# Patient Record
Sex: MALE | Race: WHITE | Employment: OTHER | URBAN - METROPOLITAN AREA
[De-identification: names, ages, dates, MRNs, and addresses within clinical notes are randomized per-mention and may not be internally consistent; named-entity substitution may affect disease eponyms.]

---

## 2017-01-23 PROCEDURE — 87086 URINE CULTURE/COLONY COUNT: CPT | Performed by: FAMILY MEDICINE

## 2017-01-26 PROBLEM — E11.9 TYPE 2 DIABETES MELLITUS WITHOUT COMPLICATION, WITHOUT LONG-TERM CURRENT USE OF INSULIN (HCC): Status: ACTIVE | Noted: 2017-01-26

## 2017-05-04 PROCEDURE — 81003 URINALYSIS AUTO W/O SCOPE: CPT | Performed by: FAMILY MEDICINE

## 2017-05-25 ENCOUNTER — HOSPITAL ENCOUNTER (OUTPATIENT)
Dept: CT IMAGING | Facility: HOSPITAL | Age: 62
Discharge: HOME | End: 2017-05-25
Attending: INTERNAL MEDICINE
Payer: COMMERCIAL

## 2017-05-25 DIAGNOSIS — R94.39 ABNORMAL CARDIOVASCULAR STRESS TEST: ICD-10-CM

## 2017-05-25 DIAGNOSIS — E78.00 HYPERCHOLESTEROLEMIA: ICD-10-CM

## 2017-05-25 DIAGNOSIS — R07.89 ATYPICAL CHEST PAIN: ICD-10-CM

## 2017-05-25 PROCEDURE — 75574 CT ANGIO HRT W/3D IMAGE: CPT | Performed by: INTERNAL MEDICINE

## 2017-05-25 RX ORDER — METOPROLOL TARTRATE 50 MG/1
TABLET, FILM COATED ORAL
Status: COMPLETED
Start: 2017-05-25 | End: 2017-05-25

## 2017-05-25 RX ORDER — NITROGLYCERIN 0.4 MG/1
TABLET SUBLINGUAL
Status: COMPLETED
Start: 2017-05-25 | End: 2017-05-25

## 2017-05-25 RX ORDER — METOPROLOL TARTRATE 5 MG/5ML
INJECTION INTRAVENOUS
Status: COMPLETED
Start: 2017-05-25 | End: 2017-05-25

## 2017-05-25 RX ADMIN — METOPROLOL TARTRATE 5 MG: 5 INJECTION INTRAVENOUS at 10:48:00

## 2017-05-25 RX ADMIN — NITROGLYCERIN 0.4 MG: 0.4 TABLET SUBLINGUAL at 10:35:00

## 2017-05-25 RX ADMIN — METOPROLOL TARTRATE: 5 INJECTION INTRAVENOUS at 10:15:00

## 2017-05-25 RX ADMIN — METOPROLOL TARTRATE 50 MG: 50 TABLET, FILM COATED ORAL at 09:30:00

## 2017-10-06 PROCEDURE — 87086 URINE CULTURE/COLONY COUNT: CPT | Performed by: FAMILY MEDICINE

## 2018-03-01 PROBLEM — Z87.438 HISTORY OF PROSTATITIS: Status: ACTIVE | Noted: 2018-03-01

## 2018-03-01 PROBLEM — R10.9 RIGHT FLANK PAIN: Status: ACTIVE | Noted: 2018-03-01

## 2018-03-01 PROBLEM — Z12.5 PROSTATE CANCER SCREENING: Status: ACTIVE | Noted: 2018-03-01

## 2018-03-01 PROBLEM — N28.1 RENAL CYST, RIGHT: Status: ACTIVE | Noted: 2018-03-01

## 2018-05-11 PROCEDURE — 82043 UR ALBUMIN QUANTITATIVE: CPT | Performed by: FAMILY MEDICINE

## 2018-05-11 PROCEDURE — 82570 ASSAY OF URINE CREATININE: CPT | Performed by: FAMILY MEDICINE

## 2018-05-11 PROCEDURE — 81003 URINALYSIS AUTO W/O SCOPE: CPT | Performed by: FAMILY MEDICINE

## 2018-06-28 PROBLEM — E78.5 HYPERLIPIDEMIA ASSOCIATED WITH TYPE 2 DIABETES MELLITUS (HCC): Status: ACTIVE | Noted: 2018-06-28

## 2018-06-28 PROBLEM — E11.69 HYPERLIPIDEMIA ASSOCIATED WITH TYPE 2 DIABETES MELLITUS (HCC): Status: ACTIVE | Noted: 2018-06-28

## 2018-07-09 PROBLEM — E11.29 TYPE 2 DIABETES MELLITUS WITH MICROALBUMINURIA, WITHOUT LONG-TERM CURRENT USE OF INSULIN (HCC): Status: ACTIVE | Noted: 2017-01-26

## 2018-07-09 PROBLEM — I70.0 ATHEROSCLEROSIS OF AORTA (HCC): Status: ACTIVE | Noted: 2018-07-09

## 2018-07-09 PROBLEM — R80.9 TYPE 2 DIABETES MELLITUS WITH MICROALBUMINURIA, WITHOUT LONG-TERM CURRENT USE OF INSULIN (HCC): Status: ACTIVE | Noted: 2017-01-26

## 2018-08-14 PROBLEM — I25.10 CORONARY ARTERY DISEASE INVOLVING NATIVE CORONARY ARTERY OF NATIVE HEART WITHOUT ANGINA PECTORIS: Status: ACTIVE | Noted: 2018-08-14

## 2018-10-09 ENCOUNTER — APPOINTMENT (OUTPATIENT)
Dept: LAB | Age: 63
End: 2018-10-09
Attending: INTERNAL MEDICINE
Payer: MEDICARE

## 2018-10-09 DIAGNOSIS — E78.5 HYPERLIPIDEMIA ASSOCIATED WITH TYPE 2 DIABETES MELLITUS (HCC): ICD-10-CM

## 2018-10-09 DIAGNOSIS — I49.3 FREQUENT PVCS: ICD-10-CM

## 2018-10-09 DIAGNOSIS — I25.10 CORONARY ARTERY DISEASE INVOLVING NATIVE CORONARY ARTERY OF NATIVE HEART WITHOUT ANGINA PECTORIS: ICD-10-CM

## 2018-10-09 DIAGNOSIS — E11.69 HYPERLIPIDEMIA ASSOCIATED WITH TYPE 2 DIABETES MELLITUS (HCC): ICD-10-CM

## 2018-10-09 PROCEDURE — 84132 ASSAY OF SERUM POTASSIUM: CPT

## 2018-10-09 PROCEDURE — 36415 COLL VENOUS BLD VENIPUNCTURE: CPT

## 2018-12-01 PROCEDURE — 87086 URINE CULTURE/COLONY COUNT: CPT | Performed by: PHYSICIAN ASSISTANT

## 2019-03-07 PROCEDURE — 87205 SMEAR GRAM STAIN: CPT | Performed by: UROLOGY

## 2019-03-07 PROCEDURE — 87077 CULTURE AEROBIC IDENTIFY: CPT | Performed by: UROLOGY

## 2019-03-07 PROCEDURE — 87070 CULTURE OTHR SPECIMN AEROBIC: CPT | Performed by: UROLOGY

## 2019-05-14 PROBLEM — Z00.00 MEDICARE ANNUAL WELLNESS VISIT, SUBSEQUENT: Status: ACTIVE | Noted: 2019-05-14

## 2019-06-04 PROBLEM — K57.10 DUODENAL DIVERTICULUM: Status: ACTIVE | Noted: 2019-06-04

## 2020-05-19 PROBLEM — M17.11 PRIMARY OSTEOARTHRITIS OF RIGHT KNEE: Status: ACTIVE | Noted: 2020-05-19

## 2020-06-30 PROBLEM — I70.8 AORTO-ILIAC ATHEROSCLEROSIS (HCC): Status: ACTIVE | Noted: 2018-07-09

## 2020-06-30 PROBLEM — I70.0 AORTO-ILIAC ATHEROSCLEROSIS (HCC): Status: ACTIVE | Noted: 2018-07-09

## 2020-08-18 ENCOUNTER — LAB ENCOUNTER (OUTPATIENT)
Dept: LAB | Facility: HOSPITAL | Age: 65
End: 2020-08-18
Attending: ORTHOPAEDIC SURGERY
Payer: MEDICARE

## 2020-08-18 DIAGNOSIS — M17.12 PRIMARY OSTEOARTHRITIS OF LEFT KNEE: ICD-10-CM

## 2020-08-19 LAB — SARS-COV-2 RNA RESP QL NAA+PROBE: NOT DETECTED

## 2020-08-20 ENCOUNTER — APPOINTMENT (OUTPATIENT)
Dept: GENERAL RADIOLOGY | Facility: HOSPITAL | Age: 65
End: 2020-08-20
Attending: PHYSICIAN ASSISTANT
Payer: MEDICARE

## 2020-08-20 ENCOUNTER — ANESTHESIA EVENT (OUTPATIENT)
Dept: SURGERY | Facility: HOSPITAL | Age: 65
End: 2020-08-20
Payer: MEDICARE

## 2020-08-20 ENCOUNTER — HOSPITAL ENCOUNTER (OUTPATIENT)
Facility: HOSPITAL | Age: 65
Discharge: HOME HEALTH | End: 2020-08-21
Attending: ORTHOPAEDIC SURGERY | Admitting: ORTHOPAEDIC SURGERY
Payer: MEDICARE

## 2020-08-20 ENCOUNTER — ANESTHESIA (OUTPATIENT)
Dept: SURGERY | Facility: HOSPITAL | Age: 65
End: 2020-08-20
Payer: MEDICARE

## 2020-08-20 DIAGNOSIS — M17.12 PRIMARY OSTEOARTHRITIS OF LEFT KNEE: Primary | ICD-10-CM

## 2020-08-20 LAB
ANTIBODY SCREEN: NEGATIVE
GLUCOSE BLD-MCNC: 114 MG/DL (ref 70–99)
GLUCOSE BLD-MCNC: 145 MG/DL (ref 70–99)
GLUCOSE BLD-MCNC: 163 MG/DL (ref 70–99)
GLUCOSE BLD-MCNC: 171 MG/DL (ref 70–99)
RH BLOOD TYPE: POSITIVE

## 2020-08-20 PROCEDURE — 82962 GLUCOSE BLOOD TEST: CPT

## 2020-08-20 PROCEDURE — 97116 GAIT TRAINING THERAPY: CPT

## 2020-08-20 PROCEDURE — 76942 ECHO GUIDE FOR BIOPSY: CPT | Performed by: ANESTHESIOLOGY

## 2020-08-20 PROCEDURE — 86900 BLOOD TYPING SEROLOGIC ABO: CPT

## 2020-08-20 PROCEDURE — 97161 PT EVAL LOW COMPLEX 20 MIN: CPT

## 2020-08-20 PROCEDURE — 97110 THERAPEUTIC EXERCISES: CPT

## 2020-08-20 PROCEDURE — 0SRD0J9 REPLACEMENT OF LEFT KNEE JOINT WITH SYNTHETIC SUBSTITUTE, CEMENTED, OPEN APPROACH: ICD-10-PCS | Performed by: ORTHOPAEDIC SURGERY

## 2020-08-20 PROCEDURE — 73560 X-RAY EXAM OF KNEE 1 OR 2: CPT | Performed by: PHYSICIAN ASSISTANT

## 2020-08-20 PROCEDURE — 86850 RBC ANTIBODY SCREEN: CPT

## 2020-08-20 PROCEDURE — 88311 DECALCIFY TISSUE: CPT | Performed by: ORTHOPAEDIC SURGERY

## 2020-08-20 PROCEDURE — 86901 BLOOD TYPING SEROLOGIC RH(D): CPT

## 2020-08-20 PROCEDURE — 88305 TISSUE EXAM BY PATHOLOGIST: CPT | Performed by: ORTHOPAEDIC SURGERY

## 2020-08-20 DEVICE — SMARTSET GHV GENTAMICIN HIGH VISCOSITY BONE CEMENT 40G
Type: IMPLANTABLE DEVICE | Site: HIP | Status: FUNCTIONAL
Brand: SMARTSET

## 2020-08-20 DEVICE — ATTUNE KNEE SYSTEM FEMORAL POSTERIOR STABILIZED SIZE 6 LEFT CEMENTED
Type: IMPLANTABLE DEVICE | Site: HIP | Status: FUNCTIONAL
Brand: ATTUNE

## 2020-08-20 DEVICE — ATTUNE KNEE SYSTEM TIBIAL INSERT ROTATING PLATFORM POSTERIOR STABILIZED 6 7MM AOX
Type: IMPLANTABLE DEVICE | Site: HIP | Status: FUNCTIONAL
Brand: ATTUNE

## 2020-08-20 DEVICE — ATTUNE PATELLA MEDIALIZED ANATOMIC 35MM CEMENTED AOX
Type: IMPLANTABLE DEVICE | Site: HIP | Status: FUNCTIONAL
Brand: ATTUNE

## 2020-08-20 DEVICE — ATTUNE KNEE SYSTEM TIBIAL BASE ROTATING PLATFORM SIZE 7 CEMENTED
Type: IMPLANTABLE DEVICE | Site: HIP | Status: FUNCTIONAL
Brand: ATTUNE

## 2020-08-20 RX ORDER — ENALAPRIL MALEATE 2.5 MG/1
2.5 TABLET ORAL DAILY
Status: DISCONTINUED | OUTPATIENT
Start: 2020-08-20 | End: 2020-08-21

## 2020-08-20 RX ORDER — SODIUM PHOSPHATE, DIBASIC AND SODIUM PHOSPHATE, MONOBASIC 7; 19 G/133ML; G/133ML
1 ENEMA RECTAL ONCE AS NEEDED
Status: DISCONTINUED | OUTPATIENT
Start: 2020-08-20 | End: 2020-08-21

## 2020-08-20 RX ORDER — LEVOFLOXACIN 500 MG/1
500 TABLET, FILM COATED ORAL DAILY
Status: DISCONTINUED | OUTPATIENT
Start: 2020-08-20 | End: 2020-08-21

## 2020-08-20 RX ORDER — MELATONIN
325
Status: DISCONTINUED | OUTPATIENT
Start: 2020-08-21 | End: 2020-08-21

## 2020-08-20 RX ORDER — NALOXONE HYDROCHLORIDE 0.4 MG/ML
80 INJECTION, SOLUTION INTRAMUSCULAR; INTRAVENOUS; SUBCUTANEOUS AS NEEDED
Status: DISCONTINUED | OUTPATIENT
Start: 2020-08-20 | End: 2020-08-20 | Stop reason: HOSPADM

## 2020-08-20 RX ORDER — ONDANSETRON 2 MG/ML
4 INJECTION INTRAMUSCULAR; INTRAVENOUS AS NEEDED
Status: DISCONTINUED | OUTPATIENT
Start: 2020-08-20 | End: 2020-08-20 | Stop reason: HOSPADM

## 2020-08-20 RX ORDER — SODIUM CHLORIDE 9 MG/ML
INJECTION, SOLUTION INTRAVENOUS CONTINUOUS
Status: DISCONTINUED | OUTPATIENT
Start: 2020-08-20 | End: 2020-08-21

## 2020-08-20 RX ORDER — DEXTROSE MONOHYDRATE 25 G/50ML
50 INJECTION, SOLUTION INTRAVENOUS
Status: DISCONTINUED | OUTPATIENT
Start: 2020-08-20 | End: 2020-08-21

## 2020-08-20 RX ORDER — DEXAMETHASONE SODIUM PHOSPHATE 4 MG/ML
VIAL (ML) INJECTION AS NEEDED
Status: DISCONTINUED | OUTPATIENT
Start: 2020-08-20 | End: 2020-08-20 | Stop reason: SURG

## 2020-08-20 RX ORDER — POLYETHYLENE GLYCOL 3350 17 G/17G
17 POWDER, FOR SOLUTION ORAL DAILY PRN
Status: DISCONTINUED | OUTPATIENT
Start: 2020-08-20 | End: 2020-08-21

## 2020-08-20 RX ORDER — DEXTROSE MONOHYDRATE 25 G/50ML
50 INJECTION, SOLUTION INTRAVENOUS
Status: DISCONTINUED | OUTPATIENT
Start: 2020-08-20 | End: 2020-08-20 | Stop reason: HOSPADM

## 2020-08-20 RX ORDER — ONDANSETRON 2 MG/ML
4 INJECTION INTRAMUSCULAR; INTRAVENOUS EVERY 4 HOURS PRN
Status: DISCONTINUED | OUTPATIENT
Start: 2020-08-20 | End: 2020-08-21

## 2020-08-20 RX ORDER — OXYCODONE HYDROCHLORIDE 15 MG/1
15 TABLET ORAL EVERY 4 HOURS PRN
Status: DISCONTINUED | OUTPATIENT
Start: 2020-08-20 | End: 2020-08-21

## 2020-08-20 RX ORDER — PANTOPRAZOLE SODIUM 40 MG/1
40 TABLET, DELAYED RELEASE ORAL
COMMUNITY
End: 2021-08-09

## 2020-08-20 RX ORDER — BISACODYL 10 MG
10 SUPPOSITORY, RECTAL RECTAL
Status: DISCONTINUED | OUTPATIENT
Start: 2020-08-20 | End: 2020-08-21

## 2020-08-20 RX ORDER — DIPHENHYDRAMINE HYDROCHLORIDE 50 MG/ML
25 INJECTION INTRAMUSCULAR; INTRAVENOUS ONCE AS NEEDED
Status: ACTIVE | OUTPATIENT
Start: 2020-08-20 | End: 2020-08-20

## 2020-08-20 RX ORDER — ACETAMINOPHEN 325 MG/1
650 TABLET ORAL EVERY 6 HOURS PRN
Status: DISCONTINUED | OUTPATIENT
Start: 2020-08-20 | End: 2020-08-20 | Stop reason: HOSPADM

## 2020-08-20 RX ORDER — SODIUM CHLORIDE, SODIUM LACTATE, POTASSIUM CHLORIDE, CALCIUM CHLORIDE 600; 310; 30; 20 MG/100ML; MG/100ML; MG/100ML; MG/100ML
INJECTION, SOLUTION INTRAVENOUS CONTINUOUS
Status: DISCONTINUED | OUTPATIENT
Start: 2020-08-20 | End: 2020-08-20 | Stop reason: HOSPADM

## 2020-08-20 RX ORDER — PANTOPRAZOLE SODIUM 40 MG/1
40 TABLET, DELAYED RELEASE ORAL
Status: DISCONTINUED | OUTPATIENT
Start: 2020-08-21 | End: 2020-08-21

## 2020-08-20 RX ORDER — DEXAMETHASONE SODIUM PHOSPHATE 10 MG/ML
8 INJECTION, SOLUTION INTRAMUSCULAR; INTRAVENOUS ONCE
Status: COMPLETED | OUTPATIENT
Start: 2020-08-21 | End: 2020-08-21

## 2020-08-20 RX ORDER — HYDROMORPHONE HYDROCHLORIDE 1 MG/ML
0.4 INJECTION, SOLUTION INTRAMUSCULAR; INTRAVENOUS; SUBCUTANEOUS EVERY 5 MIN PRN
Status: DISCONTINUED | OUTPATIENT
Start: 2020-08-20 | End: 2020-08-20 | Stop reason: HOSPADM

## 2020-08-20 RX ORDER — BUPRENORPHINE HYDROCHLORIDE 0.32 MG/ML
INJECTION INTRAMUSCULAR; INTRAVENOUS AS NEEDED
Status: DISCONTINUED | OUTPATIENT
Start: 2020-08-20 | End: 2020-08-20 | Stop reason: SURG

## 2020-08-20 RX ORDER — METOCLOPRAMIDE HYDROCHLORIDE 5 MG/ML
10 INJECTION INTRAMUSCULAR; INTRAVENOUS AS NEEDED
Status: DISCONTINUED | OUTPATIENT
Start: 2020-08-20 | End: 2020-08-20 | Stop reason: HOSPADM

## 2020-08-20 RX ORDER — ENALAPRIL MALEATE 2.5 MG/1
2.5 TABLET ORAL DAILY
COMMUNITY
End: 2021-08-09

## 2020-08-20 RX ORDER — METOCLOPRAMIDE HYDROCHLORIDE 5 MG/ML
10 INJECTION INTRAMUSCULAR; INTRAVENOUS EVERY 6 HOURS PRN
Status: DISCONTINUED | OUTPATIENT
Start: 2020-08-20 | End: 2020-08-21

## 2020-08-20 RX ORDER — ACETAMINOPHEN 500 MG
1000 TABLET ORAL 4 TIMES DAILY
Status: DISCONTINUED | OUTPATIENT
Start: 2020-08-20 | End: 2020-08-21

## 2020-08-20 RX ORDER — TIZANIDINE 2 MG/1
2 TABLET ORAL 3 TIMES DAILY PRN
Status: DISCONTINUED | OUTPATIENT
Start: 2020-08-20 | End: 2020-08-21

## 2020-08-20 RX ORDER — ACETAMINOPHEN 500 MG
1000 TABLET ORAL ONCE
Status: DISCONTINUED | OUTPATIENT
Start: 2020-08-20 | End: 2020-08-20 | Stop reason: HOSPADM

## 2020-08-20 RX ORDER — ATORVASTATIN CALCIUM 40 MG/1
40 TABLET, FILM COATED ORAL NIGHTLY
Status: DISCONTINUED | OUTPATIENT
Start: 2020-08-20 | End: 2020-08-21

## 2020-08-20 RX ORDER — OXYCODONE HYDROCHLORIDE 5 MG/1
5 TABLET ORAL EVERY 4 HOURS PRN
Status: DISCONTINUED | OUTPATIENT
Start: 2020-08-20 | End: 2020-08-21

## 2020-08-20 RX ORDER — HYDROCODONE BITARTRATE AND ACETAMINOPHEN 10; 325 MG/1; MG/1
2 TABLET ORAL AS NEEDED
Status: DISCONTINUED | OUTPATIENT
Start: 2020-08-20 | End: 2020-08-20 | Stop reason: HOSPADM

## 2020-08-20 RX ORDER — BUPIVACAINE HYDROCHLORIDE 7.5 MG/ML
INJECTION, SOLUTION INTRASPINAL AS NEEDED
Status: DISCONTINUED | OUTPATIENT
Start: 2020-08-20 | End: 2020-08-20 | Stop reason: SURG

## 2020-08-20 RX ORDER — DIPHENHYDRAMINE HCL 25 MG
25 CAPSULE ORAL EVERY 4 HOURS PRN
Status: DISCONTINUED | OUTPATIENT
Start: 2020-08-20 | End: 2020-08-21

## 2020-08-20 RX ORDER — HYDROMORPHONE HYDROCHLORIDE 1 MG/ML
0.4 INJECTION, SOLUTION INTRAMUSCULAR; INTRAVENOUS; SUBCUTANEOUS EVERY 2 HOUR PRN
Status: DISCONTINUED | OUTPATIENT
Start: 2020-08-20 | End: 2020-08-21

## 2020-08-20 RX ORDER — SENNOSIDES 8.6 MG
17.2 TABLET ORAL NIGHTLY
Status: DISCONTINUED | OUTPATIENT
Start: 2020-08-20 | End: 2020-08-21

## 2020-08-20 RX ORDER — ZOLPIDEM TARTRATE 5 MG/1
5 TABLET ORAL NIGHTLY PRN
Status: DISCONTINUED | OUTPATIENT
Start: 2020-08-20 | End: 2020-08-21

## 2020-08-20 RX ORDER — PROCHLORPERAZINE EDISYLATE 5 MG/ML
10 INJECTION INTRAMUSCULAR; INTRAVENOUS EVERY 6 HOURS PRN
Status: DISCONTINUED | OUTPATIENT
Start: 2020-08-20 | End: 2020-08-21

## 2020-08-20 RX ORDER — MEPERIDINE HYDROCHLORIDE 25 MG/ML
12.5 INJECTION INTRAMUSCULAR; INTRAVENOUS; SUBCUTANEOUS AS NEEDED
Status: DISCONTINUED | OUTPATIENT
Start: 2020-08-20 | End: 2020-08-20 | Stop reason: HOSPADM

## 2020-08-20 RX ORDER — DOCUSATE SODIUM 100 MG/1
100 CAPSULE, LIQUID FILLED ORAL 2 TIMES DAILY
Status: DISCONTINUED | OUTPATIENT
Start: 2020-08-20 | End: 2020-08-21

## 2020-08-20 RX ORDER — ACETAMINOPHEN 325 MG/1
TABLET ORAL
Status: COMPLETED
Start: 2020-08-20 | End: 2020-08-20

## 2020-08-20 RX ORDER — MIDAZOLAM HYDROCHLORIDE 1 MG/ML
INJECTION INTRAMUSCULAR; INTRAVENOUS AS NEEDED
Status: DISCONTINUED | OUTPATIENT
Start: 2020-08-20 | End: 2020-08-20 | Stop reason: SURG

## 2020-08-20 RX ORDER — VANCOMYCIN HYDROCHLORIDE
15 ONCE
Status: DISCONTINUED | OUTPATIENT
Start: 2020-08-20 | End: 2020-08-20 | Stop reason: HOSPADM

## 2020-08-20 RX ORDER — HYDROMORPHONE HYDROCHLORIDE 1 MG/ML
0.2 INJECTION, SOLUTION INTRAMUSCULAR; INTRAVENOUS; SUBCUTANEOUS EVERY 2 HOUR PRN
Status: DISCONTINUED | OUTPATIENT
Start: 2020-08-20 | End: 2020-08-21

## 2020-08-20 RX ORDER — OXYCODONE HYDROCHLORIDE 10 MG/1
10 TABLET ORAL EVERY 4 HOURS PRN
Status: DISCONTINUED | OUTPATIENT
Start: 2020-08-20 | End: 2020-08-21

## 2020-08-20 RX ORDER — ATORVASTATIN CALCIUM 40 MG/1
40 TABLET, FILM COATED ORAL NIGHTLY
COMMUNITY
End: 2021-08-09

## 2020-08-20 RX ORDER — HYDROCODONE BITARTRATE AND ACETAMINOPHEN 10; 325 MG/1; MG/1
1 TABLET ORAL AS NEEDED
Status: DISCONTINUED | OUTPATIENT
Start: 2020-08-20 | End: 2020-08-20 | Stop reason: HOSPADM

## 2020-08-20 RX ORDER — HYDROMORPHONE HYDROCHLORIDE 1 MG/ML
0.8 INJECTION, SOLUTION INTRAMUSCULAR; INTRAVENOUS; SUBCUTANEOUS EVERY 2 HOUR PRN
Status: DISCONTINUED | OUTPATIENT
Start: 2020-08-20 | End: 2020-08-21

## 2020-08-20 RX ORDER — DIPHENHYDRAMINE HYDROCHLORIDE 50 MG/ML
12.5 INJECTION INTRAMUSCULAR; INTRAVENOUS EVERY 4 HOURS PRN
Status: DISCONTINUED | OUTPATIENT
Start: 2020-08-20 | End: 2020-08-21

## 2020-08-20 RX ORDER — SODIUM CHLORIDE, SODIUM LACTATE, POTASSIUM CHLORIDE, CALCIUM CHLORIDE 600; 310; 30; 20 MG/100ML; MG/100ML; MG/100ML; MG/100ML
INJECTION, SOLUTION INTRAVENOUS CONTINUOUS
Status: DISCONTINUED | OUTPATIENT
Start: 2020-08-20 | End: 2020-08-20

## 2020-08-20 RX ORDER — VANCOMYCIN HYDROCHLORIDE
15 ONCE
Status: COMPLETED | OUTPATIENT
Start: 2020-08-20 | End: 2020-08-21

## 2020-08-20 RX ADMIN — BUPIVACAINE HYDROCHLORIDE 1.8 ML: 7.5 INJECTION, SOLUTION INTRASPINAL at 09:33:00

## 2020-08-20 RX ADMIN — BUPRENORPHINE HYDROCHLORIDE 150 MCG: 0.32 INJECTION INTRAMUSCULAR; INTRAVENOUS at 09:37:00

## 2020-08-20 RX ADMIN — MIDAZOLAM HYDROCHLORIDE 4 MG: 1 INJECTION INTRAMUSCULAR; INTRAVENOUS at 09:29:00

## 2020-08-20 RX ADMIN — DEXAMETHASONE SODIUM PHOSPHATE 2 MG: 4 MG/ML VIAL (ML) INJECTION at 09:37:00

## 2020-08-20 NOTE — INTERVAL H&P NOTE
Pre-op Diagnosis: Primary osteoarthritis of left knee [M17.12]    The above referenced H&P was reviewed by Di Spaulding MD on 8/20/2020, the patient was examined and no significant changes have occurred in the patient's condition since the H&P was perf

## 2020-08-20 NOTE — PHYSICAL THERAPY NOTE
PHYSICAL THERAPY KNEE EVALUATION - INPATIENT     Room Number: 353/353-A  Evaluation Date: 8/20/2020  Type of Evaluation: Initial  Physician Order: PT Eval and Treat    Presenting Problem: L TKA  Reason for Therapy: Mobility Dysfunction and Discharge [facility]   • LAP, SURG; COLECTOMY, PARTIAL, W/ANASTOMOSIS, W/COLOPROCTOSTOMY  9-12-11 [address]   • OTHER SURGICAL HISTORY  11/11/13    Cysto - Dr. Jennifer Ace   • OTHER SURGICAL HISTORY  09/11/2019    Cystoscopy w/ Dr Ba Daily   • [address] (including a wheelchair)?: A Little   -   Need to walk in hospital room?: A Little   -   Climbing 3-5 steps with a railing?: A Little       AM-PAC Score:  Raw Score: 18   Approx Degree of Impairment: 46.58%   Standardized Score (AM-PAC Scale): 43.63   CMS limitations in independent bed mobility, transfers, and gait. The patient is below baseline and would benefit from skilled inpatient PT to address the above deficits to assist patient in returning to prior to level of function.   DISCHARGE RECOMMENDATIONS

## 2020-08-20 NOTE — OPERATIVE REPORT
PATIENT'S NAME: Ismael Baltazar   ATTENDING PHYSICIAN: Anthony Burton MD   OPERATING PHYSICIAN: Anthony Burton MD   PATIENT ACCOUNT#:   [de-identified]    LOCATION:  [address] [place] [address],[address] RECORD #:   [id number]    YOB: 1955  ADM joint was adequately visualized. Irrisept irrigation was placed in the wound and allowed to sit for 1 minute before evacuating the solution. The knee was then flexed and the drill used to gain intramedullary access to the femur.   The intramedullary daya w punch, which was left in place for trialing. The femur was impacted in place, the trial polyethylene was placed. The  knee was brought into full extension with good flexion and good ligament balance throughout. Attention was turned to the patella.   The suture was used in a figure-of-eight fashion at the medial parapatellar region to reinforce the repair. When the repair was complete, the knee was flexed to confirm stable repair.   Subcutaneous tissue was then closed with inverted 2-0 Vicryl suture and th

## 2020-08-20 NOTE — BRIEF OP NOTE
Pre-Operative Diagnosis: Primary osteoarthritis of left knee [M17.12]     Post-Operative Diagnosis: Primary osteoarthritis of left knee [M17.12]      Procedure Performed:   Procedure(s):  LEFT TOTAL KNEE ARTHROPLASTY    Surgeon(s) and Role:     Shine Abdullahi,

## 2020-08-20 NOTE — ANESTHESIA PROCEDURE NOTES
Regional [facility]  Performed by: Max Ortega MD  Authorized by: Max Ortega MD       General Information and Staff    Start Time:  8/20/2020 9:40 AM  End Time:  8/20/2020 9:42 AM  Anesthesiologist:  Max Ortega MD  Performed by:   Anesthes

## 2020-08-20 NOTE — PLAN OF CARE
Pt admitted from surgery at 1230. Awake and alert. Spouse at bedside. ROPER. Rates pain 3/10. Received Oxy 10 as per order. Dressing dry and intact to left knee with gel ice in place. Pt and spouse verbalized understanding of POC and fall precautions.

## 2020-08-20 NOTE — ANESTHESIA POSTPROCEDURE EVALUATION
[address] Patient Status:  Outpatient in a Bed   Age/Gender 77year old male MRN [id number]   [facility] SURGERY Attending Emi Louise MD   Hosp Day # 0 PCP Nico Wilkes MD       Anesthesia Post-op Note    P

## 2020-08-20 NOTE — H&P
Oscar Baltazar   8/6/2020 10:00 AM   Office Visit   MRN:  [id number]   Description: 77year old male Provider: Collins Sibley MD Department: [facility]   Scanning Cover Sheet     Click to print Barcode Encounter Cover Sheet for scanning   Off History of prostatitis     Renal cyst, right     Right flank pain     Hyperlipidemia associated with type 2 diabetes mellitus ([address]     Aorto-iliac atherosclerosis (HCC)     Coronary artery disease involving native coronary artery of native heart withou • COLONOSCOPY   2005     aldo edge   • COLONOSCOPY, POSSIBLE BIOPSY, POSSIBLE POLYPECTOMY 07518 N/A 8/21/2019     Performed by Navya Haynes MD at [facility]   • ESOPHAGOGASTRODUODENOSCOPY, COLONOSCOPY, POSSIBLE BIOPSY, POSSIBLE POLYPECTOMY Substance and Sexual Activity      Alcohol use: No        Alcohol/week: 0.0 standard drinks      Drug use: No    Other Topics      Concerns:         Service: No        Blood Transfusions: No        Caffeine Concern: No        Occupational Exposur TSH 0.350 - 5.500 uIU/mL 2.151 2.103 3.036 2.590 2.730 3.620 3. 300      [facility] LAB REVIEW FLOWSHEET PSA Latest Ref Rng & Units 7/15/2020 8/27/2018 4/12/2017   PSA 0.01 - 4 ng/mL 0.926 0.81 0.93            General Health             Functional Ability Creatinine, Serum (mg/dL)   Date Value   09/09/2015 1.09          Creatinine (mg/dL)   Date Value   10/22/2019 0.98        Digoxin Serum Conc  Annually No results found for: DIGOXIN           Diabetes       HgbA1C  Annually     Hemoglobin A1c (%)   Date [facility] • ARTHROSCOPY KNEE WITH MEDIAL MENISCECTOMY Left 6/2/2009     Performed by Shad Lux MD at [id number]   • CATARACT Right     • CHOLECYSTECTOMY       • COLONOSCOPY   aldo leach   • COLONOSCOPY, POSSIBLE BIOPSY, POSSIBLE POLY REVIEW OF SYSTEMS:   GENERAL: feels well otherwise  SKIN: denies any unusual skin lesions; sees derm -all ok  EYES: denies blurred vision or double vision  HEENT: denies nasal congestion, sinus pain or ST; has some hearing loss; has hearing aids but doesn' -     OFFICE/OUTPT VISIT,EST,LEVL IV  Acceptable risk for anesthesia and elective L knee replacement surgery  Pre op H&P will be forwarded to requesting surgeon via FAX and emr     Pre-op exam  -     MRSA / MSSA PRE-OP  -     CBC WITH DIFFERENTIAL WITH CONCEPCIÓN There are no diagnoses linked to this encounter. The patient indicates understanding of these issues and agrees to the plan.   The patient is asked to return in 4 mos for labs and DM follow up  Diet counseling perfomed  Exercise counseling perfomed     REESE

## 2020-08-20 NOTE — ANESTHESIA PROCEDURE NOTES
Spinal Block  Performed by: Landa Cushing, MD  Authorized by: Landa Cushing, MD       General Information and Staff    Start Time:  8/20/2020 9:27 AM  End Time:  8/20/2020 9:35 AM  Anesthesiologist:  Landa Cushing, MD  Performed by:   Anesthesio

## 2020-08-20 NOTE — ANESTHESIA PREPROCEDURE EVALUATION
PRE-OP EVALUATION    Patient Name: Denise Baltazar    Pre-op Diagnosis: Primary osteoarthritis of left knee [M17.12]    Procedure(s):  LEFT TOTAL KNEE ARTHROPLASTY    Surgeon(s) and Role:     Andra Baumgarten, MD - Primary    Pre-op vitals reviewed. DAILY WITH MEALS, Disp: 14 tablet, Rfl: 0  [DISCONTINUED] atorvastatin 40 MG Oral Tab, Take 1 tablet (40 mg total) by mouth once daily. 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

## 2020-08-20 NOTE — CONSULTS
[facility] Hospitalist Team  Consult Note       Assessment/Plan:       # S/p L TKA  - post-op management per ortho  - PT/OT  - PRN pain and nausea control, transition to PO as tolerated  - DVT ppx: eliquis  - bowel regimen  - monitor for post-op anemia    #HTN- unspecified whether generalized or localized, unspecified site    • Prostatitis    • Renal cyst, right 3/1/2018   • Type 2 diabetes mellitus without complication, without long-term current use of insulin ([facility] 1/26/2017      Past Surgical History:   Proced chest pain  RESPIRATORY:  (-) cough (-) shortness of breath (-) hemoptysis  GASTROINTESTINAL:  (-) nausea (-) vomiting (-) diarrhea  GENITOURINARY:  (-) dysuria (-) frequency (-)urgency  MUSCULOSKELETAL:  (-) arthritis (-) muscle cramps  SKIN:  (-) rashes 171* 114*       No results for input(s): TROP in the last 168 hours.

## 2020-08-20 NOTE — ANESTHESIA PROCEDURE NOTES
[facility]  Performed by: Arina Hester MD  Authorized by: Arina Hester MD       General Information and Staff    Start Time:  8/20/2020 9:38 AM  End Time:  8/20/2020 9:40 AM  Anesthesiologist:  Arina Hester MD  Performed by:   Anesthes

## 2020-08-21 VITALS
HEIGHT: 71 IN | RESPIRATION RATE: 20 BRPM | DIASTOLIC BLOOD PRESSURE: 61 MMHG | TEMPERATURE: 99 F | HEART RATE: 55 BPM | BODY MASS INDEX: 28.64 KG/M2 | OXYGEN SATURATION: 93 % | SYSTOLIC BLOOD PRESSURE: 139 MMHG | WEIGHT: 204.56 LBS

## 2020-08-21 PROBLEM — Z47.89 ORTHOPEDIC AFTERCARE: Status: ACTIVE | Noted: 2020-08-21

## 2020-08-21 LAB
GLUCOSE BLD-MCNC: 147 MG/DL (ref 70–99)
GLUCOSE BLD-MCNC: 234 MG/DL (ref 70–99)

## 2020-08-21 PROCEDURE — 97116 GAIT TRAINING THERAPY: CPT

## 2020-08-21 PROCEDURE — 97165 OT EVAL LOW COMPLEX 30 MIN: CPT

## 2020-08-21 PROCEDURE — 97110 THERAPEUTIC EXERCISES: CPT

## 2020-08-21 PROCEDURE — 82962 GLUCOSE BLOOD TEST: CPT

## 2020-08-21 PROCEDURE — 97535 SELF CARE MNGMENT TRAINING: CPT

## 2020-08-21 RX ORDER — KETOROLAC TROMETHAMINE 30 MG/ML
30 INJECTION, SOLUTION INTRAMUSCULAR; INTRAVENOUS ONCE
Status: COMPLETED | OUTPATIENT
Start: 2020-08-21 | End: 2020-08-21

## 2020-08-21 RX ORDER — TRAMADOL HYDROCHLORIDE 50 MG/1
50 TABLET ORAL EVERY 4 HOURS PRN
Status: DISCONTINUED | OUTPATIENT
Start: 2020-08-21 | End: 2020-08-21

## 2020-08-21 RX ORDER — TRAMADOL HYDROCHLORIDE 50 MG/1
25 TABLET ORAL EVERY 4 HOURS PRN
Status: DISCONTINUED | OUTPATIENT
Start: 2020-08-21 | End: 2020-08-21

## 2020-08-21 RX ORDER — TRAMADOL HYDROCHLORIDE 50 MG/1
25 TABLET ORAL EVERY 6 HOURS PRN
Qty: 20 TABLET | Refills: 0 | Status: ON HOLD | OUTPATIENT
Start: 2020-08-21 | End: 2020-09-21

## 2020-08-21 RX ORDER — OXYCODONE HYDROCHLORIDE 5 MG/1
2.5 TABLET ORAL EVERY 4 HOURS PRN
Status: DISCONTINUED | OUTPATIENT
Start: 2020-08-21 | End: 2020-08-21

## 2020-08-21 RX ORDER — TIZANIDINE 2 MG/1
1 TABLET ORAL 2 TIMES DAILY PRN
Status: DISCONTINUED | OUTPATIENT
Start: 2020-08-21 | End: 2020-08-21

## 2020-08-21 NOTE — CM/SW NOTE
08/21/20 1000   CM/SW Referral Data   Referral Source Social Work (self-referral)   Reason for Referral Discharge planning   Informant Patient;Spouse   Patient Info   Patient's Mental Status Alert;Oriented   Discharge Needs   Anticipated D/C needs Home

## 2020-08-21 NOTE — PHYSICAL THERAPY NOTE
PHYSICAL THERAPY KNEE TREATMENT NOTE - INPATIENT     Room Number: 353/353-A     Session: 1   Number of Visits to Meet Established Goals: 3    Presenting Problem: L TKA    Problem List  Active Problems:    * No active hospital problems.  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   Pt completed stair training to emulate home environment, and educated on TKA Daily    CURRENT GOALS    Goal #1     Patient is able to demonstrate supine - sit EOB @ level: supervision    Goal #2     Patient is able to demonstrate transfers Sit to/from Stand at assistance level: supervison    Goal #3     Patient is able to ambulate

## 2020-08-21 NOTE — PROGRESS NOTES
[facility]  Progress Note    Raven Baltazar Patient Status:  Outpatient in a Bed    3/19/1955 MRN [id number]   [facility] 3SW-A Attending Venessa Ruvalcaba MD   Hosp Day # 0 PCP Nickolas Guerrier MD     SUBJECTIVE:  INTERVAL HISTORY:

## 2020-08-21 NOTE — OCCUPATIONAL THERAPY NOTE
OCCUPATIONAL THERAPY QUICK EVALUATION - INPATIENT    Room Number: 353/353-A  Evaluation Date: 8/21/2020     Type of Evaluation: Quick Eval  Presenting Problem: s/p L TKR on 8/20     Physician Order: IP Consult to Occupational Therapy  Reason for Therapy: W/ANASTOMOSIS, W/COLOPROCTOSTOMY  9-12-11 [facility]   • OTHER SURGICAL HISTORY  11/11/13    Cysto - Dr. Demar Luciano   • OTHER SURGICAL HISTORY  09/11/2019    Cystoscopy w/ Dr Lb Mae   • REMOVAL GALLBLADDER     • SKIN SURGERY  07/01/2019    Excision of SC such as brushing teeth?: None  -   Eating meals?: None    AM-PAC Score:  Score: 21  Approx Degree of Impairment: 32.79%  Standardized Score (AM-PAC Scale): 44.27  CMS Modifier (G-Code): CJ    FUNCTIONAL TRANSFER ASSESSMENT  Supine to Sit : Supervision  Sit options    Overall Complexity  LOW     OT Discharge Recommendations: Home with home health PT/OT  OT Device Recommendations: Reacher;3-in-1 commode    PLAN   Patient has been evaluated and presents with no skilled Occupational Therapy needs at this time.

## 2020-08-21 NOTE — PLAN OF CARE
Pt c/o drowsiness and nausea this am after taking oxy. Nausea improved with zofran. Pain service notified, pain med switched to Tramadol. One dose toradol also given. Pt states he feels much better, pain improved and no nausea. Ambulates with SBA.

## 2020-08-21 NOTE — PLAN OF CARE
Patient unable to urinate, bladder scan >400, straight cath done 1,000 cc clear yellow urine removed.

## 2020-08-21 NOTE — PHYSICAL THERAPY NOTE
Attempted to see Pt this AM - Pt received benadryl recently, and requesting to rest prior to session. Will see in PM.  RN made aware.

## 2020-08-21 NOTE — PROGRESS NOTES
[facility] Hospitalist Progress Note                                                                   [address] [address] [place]  3/19/1955    CC:  Fu post op    Interval History:  - Doing OK, had some it Objective:  Temp:  [97.7 °F (36.5 °C)-99 °F (37.2 °C)] 99 °F (37.2 °C)  Pulse:  [56-81] 68  Resp:  [12-20] 18  BP: (140-151)/(62-81) 151/69    Exam:  Gen: No acute distress  Eyes: Pink conjunctivae, No ptosis  Pulm: Lungs clear bilaterally, normal re

## 2020-08-21 NOTE — PROGRESS NOTES
Wife at bedside. Patient sleepy but arousable. He states he took benadryl for itching and its helping but worried he is too sleepy for therapy. Therapist contacted and session time changed to afternoon. Patient and wife updated and glad.  Reviewed shyam

## 2020-08-21 NOTE — PLAN OF CARE
PT AOX4 this PM. VSS on RA. Wearing , SCDS. Performs IS appropriately. Voiding freely tonight, was straight cath x 1 during day shift. OXY for pain, pain managed well on oral pain meds. Covered with insulin tonight.  Tolerating diet, no complaints of paty

## 2020-09-21 ENCOUNTER — HOSPITAL ENCOUNTER (INPATIENT)
Facility: HOSPITAL | Age: 65
LOS: 1 days | Discharge: HOME HEALTH | DRG: 501 | End: 2020-09-22
Attending: ORTHOPAEDIC SURGERY | Admitting: ORTHOPAEDIC SURGERY
Payer: MEDICARE

## 2020-09-21 ENCOUNTER — HOSPITAL ENCOUNTER (EMERGENCY)
Facility: HOSPITAL | Age: 65
Discharge: HOME | DRG: 501 | End: 2020-09-21
Attending: EMERGENCY MEDICINE
Payer: MEDICARE

## 2020-09-21 ENCOUNTER — ANESTHESIA (OUTPATIENT)
Dept: SURGERY | Facility: HOSPITAL | Age: 65
DRG: 501 | End: 2020-09-21
Payer: MEDICARE

## 2020-09-21 ENCOUNTER — ANESTHESIA EVENT (OUTPATIENT)
Dept: SURGERY | Facility: HOSPITAL | Age: 65
DRG: 501 | End: 2020-09-21
Payer: MEDICARE

## 2020-09-21 ENCOUNTER — APPOINTMENT (OUTPATIENT)
Dept: ULTRASOUND IMAGING | Facility: HOSPITAL | Age: 65
DRG: 501 | End: 2020-09-21
Attending: ORTHOPAEDIC SURGERY
Payer: MEDICARE

## 2020-09-21 VITALS
BODY MASS INDEX: 26.6 KG/M2 | OXYGEN SATURATION: 95 % | HEART RATE: 56 BPM | HEIGHT: 71 IN | RESPIRATION RATE: 13 BRPM | DIASTOLIC BLOOD PRESSURE: 73 MMHG | SYSTOLIC BLOOD PRESSURE: 153 MMHG | WEIGHT: 190 LBS | TEMPERATURE: 98 F

## 2020-09-21 DIAGNOSIS — M25.462 KNEE EFFUSION, LEFT: Primary | ICD-10-CM

## 2020-09-21 PROBLEM — M25.062 HEMARTHROSIS OF KNEE, LEFT: Status: ACTIVE | Noted: 2020-09-21

## 2020-09-21 PROCEDURE — 96374 THER/PROPH/DIAG INJ IV PUSH: CPT

## 2020-09-21 PROCEDURE — 87040 BLOOD CULTURE FOR BACTERIA: CPT | Performed by: EMERGENCY MEDICINE

## 2020-09-21 PROCEDURE — 86140 C-REACTIVE PROTEIN: CPT | Performed by: EMERGENCY MEDICINE

## 2020-09-21 PROCEDURE — 96376 TX/PRO/DX INJ SAME DRUG ADON: CPT

## 2020-09-21 PROCEDURE — 99284 EMERGENCY DEPT VISIT MOD MDM: CPT

## 2020-09-21 PROCEDURE — 85025 COMPLETE CBC W/AUTO DIFF WBC: CPT | Performed by: EMERGENCY MEDICINE

## 2020-09-21 PROCEDURE — 85610 PROTHROMBIN TIME: CPT | Performed by: ORTHOPAEDIC SURGERY

## 2020-09-21 PROCEDURE — 80053 COMPREHEN METABOLIC PANEL: CPT | Performed by: EMERGENCY MEDICINE

## 2020-09-21 PROCEDURE — 82962 GLUCOSE BLOOD TEST: CPT

## 2020-09-21 PROCEDURE — 76882 US LMTD JT/FCL EVL NVASC XTR: CPT | Performed by: ORTHOPAEDIC SURGERY

## 2020-09-21 PROCEDURE — 0Y9D0ZZ DRAINAGE OF LEFT UPPER LEG, OPEN APPROACH: ICD-10-PCS | Performed by: ORTHOPAEDIC SURGERY

## 2020-09-21 PROCEDURE — 0LQM0ZZ REPAIR LEFT UPPER LEG TENDON, OPEN APPROACH: ICD-10-PCS | Performed by: ORTHOPAEDIC SURGERY

## 2020-09-21 PROCEDURE — 36415 COLL VENOUS BLD VENIPUNCTURE: CPT

## 2020-09-21 PROCEDURE — 76942 ECHO GUIDE FOR BIOPSY: CPT | Performed by: ANESTHESIOLOGY

## 2020-09-21 RX ORDER — ASPIRIN 325 MG
325 TABLET ORAL DAILY
Status: DISCONTINUED | OUTPATIENT
Start: 2020-09-22 | End: 2020-09-22

## 2020-09-21 RX ORDER — POLYETHYLENE GLYCOL 3350 17 G/17G
17 POWDER, FOR SOLUTION ORAL DAILY PRN
Status: CANCELLED | OUTPATIENT
Start: 2020-09-21

## 2020-09-21 RX ORDER — OXYCODONE HYDROCHLORIDE 10 MG/1
10 TABLET ORAL EVERY 4 HOURS PRN
Status: CANCELLED | OUTPATIENT
Start: 2020-09-21 | End: 2020-09-23

## 2020-09-21 RX ORDER — SODIUM CHLORIDE, SODIUM LACTATE, POTASSIUM CHLORIDE, CALCIUM CHLORIDE 600; 310; 30; 20 MG/100ML; MG/100ML; MG/100ML; MG/100ML
INJECTION, SOLUTION INTRAVENOUS CONTINUOUS
Status: DISCONTINUED | OUTPATIENT
Start: 2020-09-21 | End: 2020-09-21

## 2020-09-21 RX ORDER — ONDANSETRON 2 MG/ML
INJECTION INTRAMUSCULAR; INTRAVENOUS
Status: COMPLETED
Start: 2020-09-21 | End: 2020-09-21

## 2020-09-21 RX ORDER — ENALAPRIL MALEATE 2.5 MG/1
2.5 TABLET ORAL DAILY
Status: DISCONTINUED | OUTPATIENT
Start: 2020-09-22 | End: 2020-09-22

## 2020-09-21 RX ORDER — ATORVASTATIN CALCIUM 40 MG/1
40 TABLET, FILM COATED ORAL NIGHTLY
Status: DISCONTINUED | OUTPATIENT
Start: 2020-09-21 | End: 2020-09-22

## 2020-09-21 RX ORDER — BISACODYL 10 MG
10 SUPPOSITORY, RECTAL RECTAL
Status: CANCELLED | OUTPATIENT
Start: 2020-09-21

## 2020-09-21 RX ORDER — MEPERIDINE HYDROCHLORIDE 25 MG/ML
12.5 INJECTION INTRAMUSCULAR; INTRAVENOUS; SUBCUTANEOUS
Status: DISCONTINUED | OUTPATIENT
Start: 2020-09-21 | End: 2020-09-21 | Stop reason: HOSPADM

## 2020-09-21 RX ORDER — SODIUM CHLORIDE 9 MG/ML
INJECTION, SOLUTION INTRAVENOUS CONTINUOUS
Status: DISCONTINUED | OUTPATIENT
Start: 2020-09-21 | End: 2020-09-22

## 2020-09-21 RX ORDER — DOCUSATE SODIUM 100 MG/1
100 CAPSULE, LIQUID FILLED ORAL 2 TIMES DAILY
Status: DISCONTINUED | OUTPATIENT
Start: 2020-09-21 | End: 2020-09-22

## 2020-09-21 RX ORDER — SODIUM CHLORIDE, SODIUM LACTATE, POTASSIUM CHLORIDE, CALCIUM CHLORIDE 600; 310; 30; 20 MG/100ML; MG/100ML; MG/100ML; MG/100ML
INJECTION, SOLUTION INTRAVENOUS CONTINUOUS PRN
Status: DISCONTINUED | OUTPATIENT
Start: 2020-09-21 | End: 2020-09-21 | Stop reason: SURG

## 2020-09-21 RX ORDER — ZOLPIDEM TARTRATE 5 MG/1
5 TABLET ORAL NIGHTLY PRN
Status: CANCELLED | OUTPATIENT
Start: 2020-09-21

## 2020-09-21 RX ORDER — ONDANSETRON 2 MG/ML
INJECTION INTRAMUSCULAR; INTRAVENOUS AS NEEDED
Status: DISCONTINUED | OUTPATIENT
Start: 2020-09-21 | End: 2020-09-21 | Stop reason: SURG

## 2020-09-21 RX ORDER — METOCLOPRAMIDE HYDROCHLORIDE 5 MG/ML
10 INJECTION INTRAMUSCULAR; INTRAVENOUS EVERY 6 HOURS PRN
Status: DISCONTINUED | OUTPATIENT
Start: 2020-09-21 | End: 2020-09-22

## 2020-09-21 RX ORDER — HYDROMORPHONE HYDROCHLORIDE 1 MG/ML
1 INJECTION, SOLUTION INTRAMUSCULAR; INTRAVENOUS; SUBCUTANEOUS EVERY 2 HOUR PRN
Status: DISCONTINUED | OUTPATIENT
Start: 2020-09-21 | End: 2020-09-22

## 2020-09-21 RX ORDER — LIDOCAINE HYDROCHLORIDE 40 MG/ML
INJECTION, SOLUTION RETROBULBAR; TOPICAL AS NEEDED
Status: DISCONTINUED | OUTPATIENT
Start: 2020-09-21 | End: 2020-09-21 | Stop reason: SURG

## 2020-09-21 RX ORDER — ROCURONIUM BROMIDE 10 MG/ML
INJECTION, SOLUTION INTRAVENOUS AS NEEDED
Status: DISCONTINUED | OUTPATIENT
Start: 2020-09-21 | End: 2020-09-21 | Stop reason: SURG

## 2020-09-21 RX ORDER — NALOXONE HYDROCHLORIDE 0.4 MG/ML
80 INJECTION, SOLUTION INTRAMUSCULAR; INTRAVENOUS; SUBCUTANEOUS AS NEEDED
Status: DISCONTINUED | OUTPATIENT
Start: 2020-09-21 | End: 2020-09-21 | Stop reason: HOSPADM

## 2020-09-21 RX ORDER — OXYCODONE HYDROCHLORIDE 10 MG/1
5 TABLET ORAL EVERY 4 HOURS PRN
Status: CANCELLED | OUTPATIENT
Start: 2020-09-21 | End: 2020-09-23

## 2020-09-21 RX ORDER — DOCUSATE SODIUM 100 MG/1
100 CAPSULE, LIQUID FILLED ORAL 2 TIMES DAILY
Status: CANCELLED | OUTPATIENT
Start: 2020-09-21

## 2020-09-21 RX ORDER — DEXAMETHASONE SODIUM PHOSPHATE 4 MG/ML
VIAL (ML) INJECTION AS NEEDED
Status: DISCONTINUED | OUTPATIENT
Start: 2020-09-21 | End: 2020-09-21 | Stop reason: SURG

## 2020-09-21 RX ORDER — DEXTROSE MONOHYDRATE 25 G/50ML
50 INJECTION, SOLUTION INTRAVENOUS
Status: DISCONTINUED | OUTPATIENT
Start: 2020-09-21 | End: 2020-09-21 | Stop reason: HOSPADM

## 2020-09-21 RX ORDER — MEPERIDINE HYDROCHLORIDE 25 MG/ML
INJECTION INTRAMUSCULAR; INTRAVENOUS; SUBCUTANEOUS
Status: COMPLETED
Start: 2020-09-21 | End: 2020-09-21

## 2020-09-21 RX ORDER — LIDOCAINE HYDROCHLORIDE 10 MG/ML
INJECTION, SOLUTION EPIDURAL; INFILTRATION; INTRACAUDAL; PERINEURAL AS NEEDED
Status: DISCONTINUED | OUTPATIENT
Start: 2020-09-21 | End: 2020-09-21 | Stop reason: SURG

## 2020-09-21 RX ORDER — DIPHENHYDRAMINE HCL 50 MG
50 CAPSULE ORAL EVERY 6 HOURS PRN
Status: DISCONTINUED | OUTPATIENT
Start: 2020-09-21 | End: 2020-09-22

## 2020-09-21 RX ORDER — MIDAZOLAM HYDROCHLORIDE 1 MG/ML
0.5 INJECTION INTRAMUSCULAR; INTRAVENOUS EVERY 5 MIN PRN
Status: DISCONTINUED | OUTPATIENT
Start: 2020-09-21 | End: 2020-09-21 | Stop reason: HOSPADM

## 2020-09-21 RX ORDER — SODIUM PHOSPHATE, DIBASIC AND SODIUM PHOSPHATE, MONOBASIC 7; 19 G/133ML; G/133ML
1 ENEMA RECTAL ONCE AS NEEDED
Status: CANCELLED | OUTPATIENT
Start: 2020-09-21

## 2020-09-21 RX ORDER — ASPIRIN 325 MG
325 TABLET ORAL 2 TIMES DAILY
Status: CANCELLED | OUTPATIENT
Start: 2020-09-21

## 2020-09-21 RX ORDER — HYDROMORPHONE HYDROCHLORIDE 1 MG/ML
INJECTION, SOLUTION INTRAMUSCULAR; INTRAVENOUS; SUBCUTANEOUS
Status: COMPLETED
Start: 2020-09-21 | End: 2020-09-21

## 2020-09-21 RX ORDER — HYDROMORPHONE HYDROCHLORIDE 1 MG/ML
1 INJECTION, SOLUTION INTRAMUSCULAR; INTRAVENOUS; SUBCUTANEOUS EVERY 4 HOURS PRN
Status: DISCONTINUED | OUTPATIENT
Start: 2020-09-21 | End: 2020-09-21

## 2020-09-21 RX ORDER — METOCLOPRAMIDE HYDROCHLORIDE 5 MG/ML
10 INJECTION INTRAMUSCULAR; INTRAVENOUS EVERY 6 HOURS PRN
Status: CANCELLED | OUTPATIENT
Start: 2020-09-21

## 2020-09-21 RX ORDER — TRAMADOL HYDROCHLORIDE 50 MG/1
50 TABLET ORAL EVERY 6 HOURS PRN
Status: DISCONTINUED | OUTPATIENT
Start: 2020-09-21 | End: 2020-09-22

## 2020-09-21 RX ORDER — CEFAZOLIN SODIUM 1 G/3ML
INJECTION, POWDER, FOR SOLUTION INTRAMUSCULAR; INTRAVENOUS AS NEEDED
Status: DISCONTINUED | OUTPATIENT
Start: 2020-09-21 | End: 2020-09-21 | Stop reason: SURG

## 2020-09-21 RX ORDER — ONDANSETRON 2 MG/ML
4 INJECTION INTRAMUSCULAR; INTRAVENOUS EVERY 6 HOURS PRN
Status: CANCELLED | OUTPATIENT
Start: 2020-09-21

## 2020-09-21 RX ORDER — ZOLPIDEM TARTRATE 5 MG/1
5 TABLET ORAL NIGHTLY PRN
Status: DISCONTINUED | OUTPATIENT
Start: 2020-09-21 | End: 2020-09-22

## 2020-09-21 RX ORDER — ONDANSETRON 2 MG/ML
4 INJECTION INTRAMUSCULAR; INTRAVENOUS AS NEEDED
Status: DISCONTINUED | OUTPATIENT
Start: 2020-09-21 | End: 2020-09-21 | Stop reason: HOSPADM

## 2020-09-21 RX ORDER — OXYCODONE HYDROCHLORIDE 15 MG/1
15 TABLET ORAL EVERY 4 HOURS PRN
Status: CANCELLED | OUTPATIENT
Start: 2020-09-21 | End: 2020-09-23

## 2020-09-21 RX ORDER — ONDANSETRON 2 MG/ML
4 INJECTION INTRAMUSCULAR; INTRAVENOUS EVERY 6 HOURS PRN
Status: DISCONTINUED | OUTPATIENT
Start: 2020-09-21 | End: 2020-09-22

## 2020-09-21 RX ORDER — MIDAZOLAM HYDROCHLORIDE 1 MG/ML
INJECTION INTRAMUSCULAR; INTRAVENOUS
Status: COMPLETED
Start: 2020-09-21 | End: 2020-09-21

## 2020-09-21 RX ORDER — DEXTROSE MONOHYDRATE 25 G/50ML
50 INJECTION, SOLUTION INTRAVENOUS
Status: DISCONTINUED | OUTPATIENT
Start: 2020-09-21 | End: 2020-09-22

## 2020-09-21 RX ORDER — HYDROMORPHONE HYDROCHLORIDE 1 MG/ML
0.4 INJECTION, SOLUTION INTRAMUSCULAR; INTRAVENOUS; SUBCUTANEOUS EVERY 5 MIN PRN
Status: DISCONTINUED | OUTPATIENT
Start: 2020-09-21 | End: 2020-09-21 | Stop reason: HOSPADM

## 2020-09-21 RX ORDER — TRAMADOL HYDROCHLORIDE 50 MG/1
TABLET ORAL
Qty: 30 TABLET | Refills: 0 | Status: SHIPPED | OUTPATIENT
Start: 2020-09-21 | End: 2020-10-20

## 2020-09-21 RX ORDER — HYDRALAZINE HYDROCHLORIDE 20 MG/ML
INJECTION INTRAMUSCULAR; INTRAVENOUS AS NEEDED
Status: DISCONTINUED | OUTPATIENT
Start: 2020-09-21 | End: 2020-09-21 | Stop reason: SURG

## 2020-09-21 RX ORDER — POLYETHYLENE GLYCOL 3350 17 G/17G
17 POWDER, FOR SOLUTION ORAL DAILY PRN
Status: DISCONTINUED | OUTPATIENT
Start: 2020-09-21 | End: 2020-09-22

## 2020-09-21 RX ORDER — MORPHINE SULFATE 2 MG/ML
2 INJECTION, SOLUTION INTRAMUSCULAR; INTRAVENOUS EVERY 30 MIN PRN
Status: DISCONTINUED | OUTPATIENT
Start: 2020-09-21 | End: 2020-09-21

## 2020-09-21 RX ORDER — SODIUM CHLORIDE, SODIUM LACTATE, POTASSIUM CHLORIDE, CALCIUM CHLORIDE 600; 310; 30; 20 MG/100ML; MG/100ML; MG/100ML; MG/100ML
INJECTION, SOLUTION INTRAVENOUS CONTINUOUS
Status: DISCONTINUED | OUTPATIENT
Start: 2020-09-21 | End: 2020-09-21 | Stop reason: HOSPADM

## 2020-09-21 RX ORDER — DIPHENHYDRAMINE HCL 25 MG
25 CAPSULE ORAL EVERY 6 HOURS PRN
Status: DISCONTINUED | OUTPATIENT
Start: 2020-09-21 | End: 2020-09-22

## 2020-09-21 RX ORDER — HYDROMORPHONE HYDROCHLORIDE 1 MG/ML
0.5 INJECTION, SOLUTION INTRAMUSCULAR; INTRAVENOUS; SUBCUTANEOUS EVERY 2 HOUR PRN
Status: DISCONTINUED | OUTPATIENT
Start: 2020-09-21 | End: 2020-09-22

## 2020-09-21 RX ORDER — BISACODYL 10 MG
10 SUPPOSITORY, RECTAL RECTAL
Status: DISCONTINUED | OUTPATIENT
Start: 2020-09-21 | End: 2020-09-22

## 2020-09-21 RX ORDER — SODIUM PHOSPHATE, DIBASIC AND SODIUM PHOSPHATE, MONOBASIC 7; 19 G/133ML; G/133ML
1 ENEMA RECTAL ONCE AS NEEDED
Status: DISCONTINUED | OUTPATIENT
Start: 2020-09-21 | End: 2020-09-22

## 2020-09-21 RX ORDER — PANTOPRAZOLE SODIUM 40 MG/1
40 TABLET, DELAYED RELEASE ORAL
Status: DISCONTINUED | OUTPATIENT
Start: 2020-09-22 | End: 2020-09-22

## 2020-09-21 RX ORDER — HYDROMORPHONE HYDROCHLORIDE 1 MG/ML
0.5 INJECTION, SOLUTION INTRAMUSCULAR; INTRAVENOUS; SUBCUTANEOUS EVERY 4 HOURS PRN
Status: DISCONTINUED | OUTPATIENT
Start: 2020-09-21 | End: 2020-09-21

## 2020-09-21 RX ADMIN — ONDANSETRON 4 MG: 2 INJECTION INTRAMUSCULAR; INTRAVENOUS at 19:33:00

## 2020-09-21 RX ADMIN — HYDRALAZINE HYDROCHLORIDE 5 MG: 20 INJECTION INTRAMUSCULAR; INTRAVENOUS at 19:38:00

## 2020-09-21 RX ADMIN — DEXAMETHASONE SODIUM PHOSPHATE 2 MG: 4 MG/ML VIAL (ML) INJECTION at 20:16:00

## 2020-09-21 RX ADMIN — LIDOCAINE HYDROCHLORIDE 25 MG: 10 INJECTION, SOLUTION EPIDURAL; INFILTRATION; INTRACAUDAL; PERINEURAL at 19:12:00

## 2020-09-21 RX ADMIN — HYDRALAZINE HYDROCHLORIDE 5 MG: 20 INJECTION INTRAMUSCULAR; INTRAVENOUS at 19:44:00

## 2020-09-21 RX ADMIN — ROCURONIUM BROMIDE 50 MG: 10 INJECTION, SOLUTION INTRAVENOUS at 19:12:00

## 2020-09-21 RX ADMIN — SODIUM CHLORIDE, SODIUM LACTATE, POTASSIUM CHLORIDE, CALCIUM CHLORIDE: 600; 310; 30; 20 INJECTION, SOLUTION INTRAVENOUS at 19:26:00

## 2020-09-21 RX ADMIN — CEFAZOLIN SODIUM 2 G: 1 INJECTION, POWDER, FOR SOLUTION INTRAMUSCULAR; INTRAVENOUS at 19:13:00

## 2020-09-21 RX ADMIN — DEXAMETHASONE SODIUM PHOSPHATE 4 MG: 4 MG/ML VIAL (ML) INJECTION at 19:33:00

## 2020-09-21 RX ADMIN — LIDOCAINE HYDROCHLORIDE 120 MG: 40 INJECTION, SOLUTION RETROBULBAR; TOPICAL at 19:12:00

## 2020-09-21 NOTE — CONSULTS
General Medicine Consult      Reason for consult: post-op medical management     Consulted by: Dr. Giovanni Young    PCP: Loan Lundberg MD      History of Present Illness: Patient is a 77year old male with PMH sig for DM type II, hx PVCs, HLD, OA s/p TKA on 8/2 32[address] N/A 5/20/2014    Performed by Tyrone Casillas MD at [facility]   • ESOPHAGOGASTRODUODENOSCOPY, COLONOSCOPY, POSSIBLE BIOPSY, POSSIBLE POLYPECTOMY [address], [address] [place] [address] Drive N/A 8/4/2010    Performed by Bg Mckeon MD at [facility]   • K Medication:  • atorvastatin  40 mg Oral Nightly   • [START ON 9/22/2020] Enalapril Maleate  2.5 mg Oral Daily   • [START ON 9/22/2020] Pantoprazole Sodium  40 mg Oral QAM AC   • Insulin Aspart Pen  1-5 Units Subcutaneous TID CC and HS     Continuous Infusi kg)  08/11/20 : 210 lb (95.3 kg)  08/06/20 : 210 lb (95.3 kg)      General: alert and oriented, NAD  HEENT: normocephalic, MMM, no oropharyngeal lesions  Lungs: CTA, good effort  CV: nl S1/S2, RRR  GI: soft/NT/ND +BS  Ext: nonedematous b/l LE, ++ swelling normal compressibility, color flow, respiratory variation, and augmentation without evidence of thrombus. All visualized venous structures demonstrate spontaneous color and Doppler waveforms. Suboptimal evaluation of the calf veins due to edema.  All the vi

## 2020-09-21 NOTE — ED PROVIDER NOTES
Patient Seen in: [facility] Emergency Department      History   Patient presents with:  Postop/Procedure Problem    Stated Complaint: pt had left knee replacement 08/20/2020, pt c/o of pain in leg     HPI    55-year-old with a history of diabetes, h • COLONOSCOPY  2005    aldo edge   • COLONOSCOPY, POSSIBLE BIOPSY, POSSIBLE POLYPECTOMY 119[address] N/A 8/21/2019    Performed by Amanda Wagner MD at [address] [place] [address]   • ESOPHAGOGASTRODUODENOSCOPY, COLONOSCOPY, POSSIBLE BIOPSY, POSSIBLE POLYPECTOMY 453 Respiratory: no distress patient speaks in full sentences  Extremities: Large left knee effusion. No erythema or warmth overlies the knee, incision is without erythema or purulence. Vascular: 2+ DP PT pulses left lower extremity.   2+ femoral pulse left l respiratory variation, and augmentation without evidence of thrombus. All visualized venous  structures demonstrate spontaneous color and Doppler waveforms. Suboptimal evaluation of the calf  veins due to edema. All the visualized calf veins appear patent.

## 2020-09-21 NOTE — ED INITIAL ASSESSMENT (HPI)
Pt to ED for c/o left knee pain since Fri 9/18/20, worse this AM. Pt took Tramadol PTA, no relief. Pt had left knee replacement on 8/20/20. Pt was seen at urgent care yesterday, US done showing \"hematoma and effusion\".

## 2020-09-22 VITALS
OXYGEN SATURATION: 99 % | SYSTOLIC BLOOD PRESSURE: 123 MMHG | TEMPERATURE: 98 F | DIASTOLIC BLOOD PRESSURE: 65 MMHG | HEART RATE: 70 BPM | RESPIRATION RATE: 18 BRPM

## 2020-09-22 PROCEDURE — 97530 THERAPEUTIC ACTIVITIES: CPT

## 2020-09-22 PROCEDURE — 97116 GAIT TRAINING THERAPY: CPT

## 2020-09-22 PROCEDURE — 97535 SELF CARE MNGMENT TRAINING: CPT

## 2020-09-22 PROCEDURE — 97165 OT EVAL LOW COMPLEX 30 MIN: CPT

## 2020-09-22 PROCEDURE — 80048 BASIC METABOLIC PNL TOTAL CA: CPT | Performed by: INTERNAL MEDICINE

## 2020-09-22 PROCEDURE — 82962 GLUCOSE BLOOD TEST: CPT

## 2020-09-22 PROCEDURE — 85025 COMPLETE CBC W/AUTO DIFF WBC: CPT | Performed by: INTERNAL MEDICINE

## 2020-09-22 PROCEDURE — 97161 PT EVAL LOW COMPLEX 20 MIN: CPT

## 2020-09-22 NOTE — PLAN OF CARE
Alert and oriented,V/S stable,HR and O2 sat better,and more relaxed. Able to have about 3 hours of sleep. Behavior now is much calmer and cooperative. Acceptable with plan of care now. PT EVAL today. Medicated for pain,IS AND FOOT AND ANKLE PUMP exercises reinf

## 2020-09-22 NOTE — H&P
Elizabeth Ramirez #[id number] (9 year old M) (Adm: 09/21/20)  3SW-A Harley Quiles MD   Physician   Specialty:  SURGERY, ORTHOPEDIC   Progress Notes   Addendum   Encounter Date:  9/21/2020         Related encounter: Office Visit fr • cefdinir 300 MG Oral Cap Take 1 capsule (300 mg total) by mouth daily. 10 capsule 0   • traMADol HCl 50 MG Oral Tab Take 0.5 tablets (25 mg total) by mouth every 6 (six) hours as needed.  20 tablet 0   • atorvastatin 40 MG Oral Tab Take 40 mg by mouth nig Drug use:  No         ALLERGIES:     Oxycodone-Ibuprofen     UNKNOWN  Flagyl [Metronidazo*        Comment:rash  Other                   HYPOTENSION    Comment:ALL NARCOTICS CAUSES UNRESPONSIVENESS  Augmentin [Amoxicil*    RASH  Bactrim [Sulfametho*    FREDY Procedure: The left knee was thoroughly prepped with ChloraPrep. In a sterile technique I was able to aspirate only about 6 cc of dark blood from the lateral approach. I could not get any more fluid to come out.   Due to his intense pain he was still havi

## 2020-09-22 NOTE — PAYOR COMM NOTE
--------------  Appropriate for inpatient status per guidelines for swollen and painful knee due to large effusion in a patient with recent knee replacement.         ADMISSION REVIEW     Payor: [address] [place] [address] #:  [phone]  [facility].  Dr. Kristen Stone body is arranging for direct admit for possible open hemarthrosis evacuation from the knee later today.             9/21 HOSP       History of Present Illness: Patient is a 77year old male with PMH sig for DM type II, hx PVCs, HLD, OA s/ POSSIBLE BIOPSY, POSSIBLE POLYPECTOMY 39985, [address] N/A 5/20/2014     Performed by Jennifer Manriquez MD at [address] [facility]   • ESOPHAGOGASTRODUODENOSCOPY, COLONOSCOPY, POSSIBLE BIOPSY, POSSIBLE POLYPECTOMY [address]1206, [address] [facility] [address] N/A 8/4/2010     Performed by Dakotah Cornell unspecified COMPARISON STUDY: Left knee films dated 9/4/2020 FINDINGS: 2 views of the left knee were obtained. There appears be a large effusion and soft tissue edema around the left knee. The patellofemoral space appears increased likely due to effusion. ortho/neuro             9/21 ORTHO    PREOPERATIVE DIAGNOSIS:  Left knee hemarthrosis with quadriceps tendon failed repair. POSTOPERATIVE DIAGNOSIS:  Left knee hemarthrosis with quadriceps tendon failed repair.   PROCEDURE:  Left knee incision and drainage (DILAUDID) 1 MG/ML injection 0.5 mg     Date Action Dose Route User    9/22/2020 0454 Given 0.5 mg Intravenous Ara Urrutia RN    9/21/2020 2226 Given 0.5 mg Intravenous Ara Urrutia RN    9/21/2020 1808 Given 0.5 mg Intravenous Ara Mcgrath RN ondansetron HCl (ZOFRAN) injection     Date Action Dose Route User    9/21/2020 1933 Given 4 mg Intravenous Harmony Lind MD      ropivacaine (NAROPIN) 5 MG/ML 15 mL in Sodium Chloride 0.9 % 20 mL local anesthetic mixture     Date Action Dose Route Use

## 2020-09-22 NOTE — OPERATIVE REPORT
[facility]    PATIENT'S NAME: Rich Joel   ATTENDING PHYSICIAN: Madeleine Urbina M.D. OPERATING PHYSICIAN: Madeleine Urbina M.D.    PATIENT ACCOUNT#:   [de-identified]    LOCATION:  [address] [place] [address]  MEDICAL RECORD #:   [id number]       DATE OF BIRTH: left knee was prepped and draped in the usual sterile fashion. A knife was used to make a short longitudinal incision in line with the previous incision site. Bovie was used for hemostasis.   Dissection was carried down to the quadriceps tendon, where a d

## 2020-09-22 NOTE — PLAN OF CARE
Problem: Impaired Activities of Daily Living  Goal: Achieve highest/safest level of independence in self care  Description: Interventions:  - Assess ability and encourage patient to participate in ADLs to maximize function  - Promote sitting position [facility]

## 2020-09-22 NOTE — CM/SW NOTE
09/22/20 1500   CM/SW Screening   Referral Source Social Work (self-referral)   Information Source Chart review;Nursing rounds   Patient's Mental Status Alert;Oriented   Patient lives with Spouse       Received order for discharge planning.   Pt is a 77

## 2020-09-22 NOTE — PLAN OF CARE
Patient admitted today at 10 am with severe left knee pain, managed with iv dilaudid. Patient was sent down to IR where he was told he had torn tendon and hematoma. Patient sent to OR at 1900 via bed.

## 2020-09-22 NOTE — DIETARY NOTE
45 Geraldine Baltazar     Admitting diagnosis:  left knee pain     Ht:  5'11\"  Wt: 86.2 kg. There is no height or weight on file to calculate BMI.   IBW: 78 kg  Wt Readings from Last 6 Encounters:  09/21/20 : 86.2 kg

## 2020-09-22 NOTE — PROGRESS NOTES
[facility] Hospitalist Progress Note     [facility]      SUBJECTIVE:  Feeling better  Pain controlled  Worked with PT/OT    OBJECTIVE:  Temp:  [98 °F (36.7 °C)-99.1 °F (37.3 °C)] 98 °F (36.7 °C)  Pulse:  [] 70  Resp:  [14-24] 18  BP: (110-156)/(61-81 There is normal alignment. IMPRESSION:  Large amount of effusion and soft tissue swelling around the left knee of unknown significance.     Us Venous Doppler Leg Left - Diag Img (cpt=93971)    Result Date: 9/20/2020  DATE OF SERVICE: 09.20.2020 US VENOUS craniocaudal, AP and transverse dimensions respectively. Color flow Doppler imaging reveals no evidence of active extravasation of blood into this collection.   Upon interrogation of the anterior aspect of the left knee joint, no evidence of underlying ese Or    •  glucose (DEX4) oral liquid 30 g, 30 g, Oral, Q15 Min PRN    Or    •  Glucose-Vitamin C (DEX-4) chewable tab 8 tablet, 8 tablet, Oral, Q15 Min PRN    •  Insulin Aspart Pen (NOVOLOG) 100 UNIT/ML flexpen 1-5 Units, 1-5 Units, Subcutaneous, TID CC and house. Please call with any questions or concerns.      Candida No  Internal Medicine  [facility] Hospitalist

## 2020-09-22 NOTE — PLAN OF CARE
Written and verbal discharge instructions given to patient and family. They verbalize understanding. Patient discharged home via wheelchair with family in stable condition.  Left knee ace wrap dry and intact, pedal pulse strong, pain managed on oral pain me

## 2020-09-22 NOTE — ANESTHESIA POSTPROCEDURE EVALUATION
[address] Patient Status:  Inpatient   Age/Gender 77year old male MRN [id number]   [facility] SURGERY Attending Emi Louise MD   Hosp Day # 0 PCP Nico Wilkes MD       Anesthesia Post-op Note    Procedure(s

## 2020-09-22 NOTE — ANESTHESIA PROCEDURE NOTES
Airway  Urgency: elective      General Information and Staff    Patient location during procedure: OR  Anesthesiologist: Benoit Rossi MD  Performed: anesthesiologist     Indications and Patient Condition  Indications for airway management: anesthesia  S

## 2020-09-22 NOTE — PHYSICAL THERAPY NOTE
PHYSICAL THERAPY QUICK EVALUATION - INPATIENT    Room Number: 371/371-A  Evaluation Date: 9/22/2020  Presenting Problem: s/p evacuation L knee hematoma, quad tendon repair 9/21/20  Physician Order: PT Eval and Treat    History related to current admissio POLYPECTOMY 61217 N/A 8/21/2019    Performed by Manning Kocher, MD at [address] [place] [address]   • ESOPHAGOGASTRODUODENOSCOPY, COLONOSCOPY, POSSIBLE BIOPSY, POSSIBLE POLYPECTOMY 20381, [address] N/A 5/20/2014    Performed by Manning Kocher, MD at [facility], except for the following: Pt lacking approx 10 deg from neutral extension, pt not to flex beyond 45deg per PT order    Lower extremity strength is within functional limits except for the following:  fair quad set    NEUROLOGICAL FINDINGS  Neurological Find supervision provided. Pt with good recall of sequencing due to previous TKA. Pt instructed in glut sets, quad sets, ankle pumps. Pt instructed to await instruction from MD to return to OP PT. Verbalizes understanding.      Follow up phone call to pt spous

## 2020-09-22 NOTE — ANESTHESIA PREPROCEDURE EVALUATION
PRE-OP EVALUATION    Patient Name: Delbert Baltazar    Pre-op Diagnosis: Hematoma [T14. 8XXA]    Procedure(s):  IRRIGATION AND DEBRIDEMENT LEFT KNEE    Surgeon(s) and Role:     * Nicole Carranza MD - Primary    Pre-op vitals reviewed.   Temp: 98.9 °F (3 Tab, Take 2.5 mg by mouth daily. , Disp: , Rfl:     •  traMADol HCl 50 MG Oral Tab, Take 0.5 tablets (25 mg total) by mouth every 6 (six) hours as needed. , Disp: 20 tablet, Rfl: 0    •  atorvastatin 40 MG Oral Tab, Take 40 mg by mouth nightly., Disp: , Rfl: List:     Hypercholesterolemia     Prostatitis, acute     Gastroesophageal reflux disease with esophagitis     Bilateral carotid artery disease, unspecified type (HCC)     Chronic prostatitis     Overweight (BMI 25.0-29. 9)     Chest pain     Elevated EBV a SURGICAL HISTORY  11/11/13    Cysto - Dr. 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

## 2020-09-22 NOTE — BRIEF OP NOTE
Pre-Operative Diagnosis: Hematoma [T14. 8XXA]     Post-Operative Diagnosis: Hematoma, partial disruption quad tendon repair     Procedure Performed:   Procedure(s):  EVACUATION OF LEFT KNEE HEMATOMA, REPAIR QUAD TENDON    Surgeon(s) and Role:     Jeff Ledesma

## 2020-09-22 NOTE — PROGRESS NOTES
Back from PACU,S/P evacuation of lt.knee hematoma and REPAIR OF QUAD TENDON,per bed,alert and oriented,IVF infusing,O2 at 2L/NC.LT.LLE ON A PILLOW,ACE WRAP DRESSING,moving toes. Upset with everything that went on today,extra time spent calming patient down

## 2020-09-22 NOTE — OCCUPATIONAL THERAPY NOTE
OCCUPATIONAL THERAPY QUICK EVALUATION - INPATIENT    Room Number: 371/371-A  Evaluation Date: 9/22/2020     Type of Evaluation: Initial  Presenting Problem: Left knee hematoma and quad tendon rupture    Physician Order: IP Consult to Occupational Therapy • CHOLECYSTECTOMY     • COLONOSCOPY  2005    [facility]   • COLONOSCOPY, POSSIBLE BIOPSY, POSSIBLE POLYPECTOMY 58479 N/A 8/21/2019    Performed by Ciara Cruz MD at [facility]   • ESOPHAGOGASTRODUODENOSCOPY, COLONOSCOPY, POSSIBLE BIOPSY, within functional limits     Upper extremity strength is within functional limits     NEUROLOGICAL FINDINGS                   ACTIVITY TOLERANCE                         O2 SATURATIONS                ACTIVITIES OF DAILY LIVING ASSESSMENT  AM-PAC ‘6-Clicks’ all ADL tasks, functional transfers, dynamic reaching and functional mobility safely, without loss of balance, and at supervision level or above. Patient reports no further questions or concerns about safe return to I/ADL tasks.  No further OT services need

## 2020-09-22 NOTE — ANESTHESIA PROCEDURE NOTES
[facility]  Performed by: Krzysztof Greer MD  Authorized by: Krzysztof Greer MD       General Information and Staff    Start Time:  9/21/2020 8:18 PM  End Time:  9/21/2020 8:21 PM  Anesthesiologist:  Krzysztof Greer MD  Performed by:   Anesthesiologist

## 2020-09-23 NOTE — PAYOR COMM NOTE
--------------  DISCHARGE REVIEW    Payor: [facility] Tasha Red [place] #:  [id number]  Authorization Number: [id number]       Admit date: 9/21/20  Admit time:  8046  Discharge Date: 9/22/2020  3:19 PM     Admitting Physician: Kayley Sousa

## 2021-02-05 PROBLEM — M77.52 POSTCALCANEAL BURSITIS OF LEFT FOOT: Status: ACTIVE | Noted: 2021-02-05

## 2021-02-23 NOTE — ED NOTES
Pillow, warm blanket and mouth sponge provided. Pt's family at bedside. [General Appearance - Well Developed] : well developed [General Appearance - Well Nourished] : well nourished [Well Groomed] : well groomed [Normal Appearance] : normal appearance [General Appearance - In No Acute Distress] : no acute distress [Edema] : no peripheral edema [Exaggerated Use Of Accessory Muscles For Inspiration] : no accessory muscle use [Respiration, Rhythm And Depth] : normal respiratory rhythm and effort [Abdomen Soft] : soft [Abdomen Tenderness] : non-tender [Costovertebral Angle Tenderness] : no ~M costovertebral angle tenderness [Urethral Meatus] : meatus normal [Urinary Bladder Findings] : the bladder was normal on palpation [Scrotum] : the scrotum was normal [Rectal Exam - Seminal Vesicles] : the seminal vesicles were normal [Testes Tenderness] : no tenderness of the testes [Testes Mass (___cm)] : there were no testicular masses [Rectal Exam - Rectum] : digital rectal exam was normal [Prostate Enlargement] : the prostate was not enlarged [Prostate Tenderness] : the prostate was not tender [No Prostate Nodules] : no prostate nodules [Prostate Size ___ (0-4)] : prostate size [unfilled] (scale: 0-4) [Normal Station and Gait] : the gait and station were normal for the patient's age [] : no rash [No Focal Deficits] : no focal deficits [Oriented To Time, Place, And Person] : oriented to person, place, and time [Affect] : the affect was normal [Mood] : the mood was normal [Not Anxious] : not anxious [Inguinal Lymph Nodes Enlarged Bilaterally] : inguinal [FreeTextEntry1] : tender right epididymis

## 2021-04-11 DIAGNOSIS — Z23 NEED FOR VACCINATION: ICD-10-CM

## 2021-09-27 PROBLEM — H25.9 SENILE CATARACT OF LEFT EYE, UNSPECIFIED AGE-RELATED CATARACT TYPE: Status: ACTIVE | Noted: 2021-09-27

## 2024-04-24 NOTE — CM/SW NOTE
08/21/20 1000   Discharge disposition   Expected discharge disposition Home-Health   Name of Facillity/Home Care/Hospice [facility]   Home services after discharge Skilled home care   Discharge transportation Private car Physical Therapy Visit    Visit Type: Daily Treatment Note- Progress Note  Visit: 6  Referring Provider: Moy Owens MD  Medical Diagnosis (from order): M54.12 - Radiculopathy, cervical region     SUBJECTIVE                                                                                                               Limited change Noted   Continues to have symptomology  Current Functional Limitations: No change functional still issues     Pain / Symptoms  - Pain rating (out of 10): Current: 4       OBJECTIVE                                                                                                                     Range of Motion (ROM)   (degrees unless noted; active unless noted; norms in ( ); negative=lacking to 0, positive=beyond 0)  Cervical:    - Flexion (45-50): 50°    - Extension (45-60): 45°    - Rotation (60-80):         Left: 68°         Right: 70°    - Side Bend (45):         Left: 40°         Right: 43°  Comments: Tingle with extension        Palpation  Significant tension in left upper trap      Sensation/Dermatome Testing:    Continued issues C5-C7           Outcome/Assessments  Outcome Measures:   Neck Disability Index (NDI): Neck Disability Index Score: 13  NDI Total Possible Score: 50  NDI Score Calculated: 26 %  (scored 0-100, higher score indicates higher disability) see flowsheet for additional documentation       Treatment     Therapeutic Exercise  Upper trapezius stretch   Levator stretch   Scalene stretch   Mulligan extension and rotation   Isometric Extension Flexion lateral flexion rotation 5 x 2 seconds hold     Manual Therapy   Soft tissue       ASSESSMENT                                                                                                          To date the patient has made gains as expected.  Patient continues to have impairments and functional deficits as noted.  Patient will continue to benefit from skilled care as outlined.    Mild improvement in range of  motion   No change in sensory and nerve symptoms  No change with traction   Dry needling pending (next week)  EMG 4/25/24  Symptomology improved with therapy but currently is short lived several hours at best  Symptoms of pain increase with extension    Pain/symptoms after session (out of 10): 2  Education:   - Results of above outlined education: Verbalizes understanding and Needs reinforcement    PLAN                                                                                                                          Extend frequency and duration per below. and Modify interventions per below.  The following skilled interventions to be implemented to achieve goals listed below:  Ultrasound (81730)  Therapeutic Exercise (35474)  Neuromuscular Re-Education (47672)  Dry Needling  Manual Therapy (08648)  Therapeutic Activity (36067)  Electrical Stimulation Unattended (85459 or )    Frequency / Duration  2 times per week tapering as patient progresses for 4 weeks    Suggestions for next session as indicated: Dry needling   Mobilization cervical and thoracic   Girdle strength      Goals  Full range cervical   Full strength uE    The above improvements in impairments to assist in obtaining goals listed below  Long Term Goals: to be met by end of plan of care  1. Drive 30 minutes without increased pain  Status: progressing/ongoing  2. Independent in body mechanics  Status: progressing/ongoing  3. Walk 30 minutes without pain  Status: progressing/ongoing  4. HA 2 times a week, self treatment options understood  Status: progressing/ongoing      Therapy procedure time and total treatment time can be found documented on the Time Entry flowsheet

## (undated) DEVICE — SUTURE VICRYL 2-0 CP-1

## (undated) DEVICE — ABDOMINAL PAD: Brand: DERMACEA

## (undated) DEVICE — CONVERTORS STOCKINETTE: Brand: CONVERTORS

## (undated) DEVICE — 450 ML BOTTLE OF 0.05% CHLORHEXIDINE GLUCONATE IN 99.95% STERILE WATER FOR IRRIGATION, USP AND APPLICATOR.: Brand: IRRISEPT ANTIMICROBIAL WOUND LAVAGE

## (undated) DEVICE — CHLORAPREP 26ML APPLICATOR

## (undated) DEVICE — STERILE POLYISOPRENE POWDER-FREE SURGICAL GLOVES: Brand: PROTEXIS

## (undated) DEVICE — PREMIUM WET SKIN PREP TRAY: Brand: MEDLINE INDUSTRIES, INC.

## (undated) DEVICE — PADDING CAST COTTON  4

## (undated) DEVICE — ZIMMER® STERILE DISPOSABLE TOURNIQUET CUFF WITH PLC, DUAL PORT, SINGLE BLADDER, 34 IN. (86 CM)

## (undated) DEVICE — SIGMA LCS HIGH PERFORMANCE INSTRUMENTS STERILE THREADED PINS: Brand: SIGMA LCS HIGH PERFORMANCE

## (undated) DEVICE — Device: Brand: STABLECUT®

## (undated) DEVICE — SPECIMEN CONTAINER,POSITIVE SEAL INDICATOR, OR PACKAGED: Brand: PRECISION

## (undated) DEVICE — STERILE SYNTHETIC POLYISOPRENE POWDER-FREE SURGICAL GLOVES WITH HYDROGEL COATING, SMOOTH FINISH, STRAIGHT FINGER: Brand: PROTEXIS

## (undated) DEVICE — BOWL CEMENT MIX QUICK-VAC

## (undated) DEVICE — 2T11 #2 PDO 36 X 36: Brand: 2T11 #2 PDO 36 X 36

## (undated) DEVICE — SIGMA LCS HIGH PERFORMANCE STERILE THREADED HEADED PINS: Brand: SIGMA LCS HIGH PERFORMANCE

## (undated) DEVICE — SOL  .9 1000ML BAG

## (undated) DEVICE — SUTURE FIBERWIRE 2 AR-7202

## (undated) DEVICE — STERILE PATIENT PROTECTIVE PAD FOR IMP® KNEE POSITIONERS & COHESIVE WRAP (10 / CASE): Brand: DE MAYO KNEE POSITIONER®

## (undated) DEVICE — CHLORAPREP ORANGE TINT 10.5ML

## (undated) DEVICE — KENDALL SCD EXPRESS SLEEVES, KNEE LENGTH, MEDIUM: Brand: KENDALL SCD

## (undated) DEVICE — GOWN,SIRUS,FABRIC-REINFORCED,X-LARGE: Brand: MEDLINE

## (undated) DEVICE — LIGHT HANDLE

## (undated) DEVICE — DECANTER BAG 9": Brand: MEDLINE INDUSTRIES, INC.

## (undated) DEVICE — SUTURE VICRYL 2-0 CT-1

## (undated) DEVICE — Device

## (undated) DEVICE — WRAP COOLING KNEE W/ICE PILLOW

## (undated) DEVICE — SOL  .9 1000ML BTL

## (undated) DEVICE — DRESSING AQUACEL AG 3.5 X 10

## (undated) DEVICE — GAUZE SPONGES,12 PLY: Brand: CURITY

## (undated) DEVICE — LOWER EXTREMITY CDS-LF: Brand: MEDLINE INDUSTRIES, INC.

## (undated) DEVICE — SUTURE ETHIBOND 5 CCS

## (undated) DEVICE — HOOD, PEEL-AWAY: Brand: FLYTE

## (undated) DEVICE — PROXIMATE RH ROTATING HEAD SKIN STAPLERS (35 WIDE) CONTAINS 35 STAINLESS STEEL STAPLES: Brand: PROXIMATE

## (undated) DEVICE — DRAPE,U/SHT,SPLIT,FILM,60X84,STERILE: Brand: MEDLINE

## (undated) DEVICE — SYRINGE 30ML LL TIP

## (undated) DEVICE — NON-ADHERENT STRIPS,OIL EMULSION: Brand: CURITY

## (undated) DEVICE — TOTAL KNEE CDS: Brand: MEDLINE INDUSTRIES, INC.

## (undated) DEVICE — SUTURE NABSB OTHCRD 2 OS-6

## (undated) DEVICE — HOOK LOCK LATEX FREE ELASTIC BANDAGE 4INX5YD

## (undated) NOTE — LETTER
Fauzia Reed 89  [address] [facility]  Authorization for Surgical Operation and Procedure     Date:___________                                                                                                         Time:__________ 4.   Should the need arise during my operation or immediate post-operative period, I also consent to the administration of blood and/or blood products.   Further, I understand that despite careful testing and screening of blood or blood products by kusum 8.   I recognize that in the event my procedure results in extended X-Ray/fluoroscopy time, I may develop a skin reaction. 9.  If I have a Do Not Attempt Resuscitation (DNAR) order in place, that status will be suspended while in the operating room, proc 1. INaresh agree to be cared for by an anesthesiologist, who is specially trained to monitor me and give me medicine to put me to sleep or keep me comfortable during my procedure    I understand that my anesthesiologist is not an employee or 5. My doctor has explained to me other choices available to me for my care (alternatives).   6. Pregnant Patients (“epidural”):  I understand that the risks of having an epidural (medicine given into my back to help control pain during labor), include itchi